# Patient Record
Sex: FEMALE | URBAN - NONMETROPOLITAN AREA
[De-identification: names, ages, dates, MRNs, and addresses within clinical notes are randomized per-mention and may not be internally consistent; named-entity substitution may affect disease eponyms.]

---

## 2018-07-31 ENCOUNTER — HISTORICAL (OUTPATIENT)
Dept: ADMINISTRATIVE | Facility: HOSPITAL | Age: 53
End: 2018-07-31

## 2018-08-01 ENCOUNTER — HISTORICAL (OUTPATIENT)
Dept: ADMINISTRATIVE | Facility: HOSPITAL | Age: 53
End: 2018-08-01

## 2018-12-14 ENCOUNTER — HISTORICAL (OUTPATIENT)
Dept: ADMINISTRATIVE | Facility: HOSPITAL | Age: 53
End: 2018-12-14

## 2019-09-06 ENCOUNTER — HISTORICAL (OUTPATIENT)
Dept: ADMINISTRATIVE | Facility: HOSPITAL | Age: 54
End: 2019-09-06

## 2020-03-04 ENCOUNTER — HISTORICAL (OUTPATIENT)
Dept: ADMINISTRATIVE | Facility: HOSPITAL | Age: 55
End: 2020-03-04

## 2025-03-02 ENCOUNTER — HOSPITAL ENCOUNTER (EMERGENCY)
Facility: HOSPITAL | Age: 60
Discharge: HOME | End: 2025-03-02
Attending: FAMILY MEDICINE
Payer: COMMERCIAL

## 2025-03-02 VITALS
SYSTOLIC BLOOD PRESSURE: 147 MMHG | TEMPERATURE: 98 F | DIASTOLIC BLOOD PRESSURE: 96 MMHG | BODY MASS INDEX: 39.24 KG/M2 | OXYGEN SATURATION: 97 % | HEIGHT: 67 IN | HEART RATE: 74 BPM | WEIGHT: 250 LBS | RESPIRATION RATE: 18 BRPM

## 2025-03-02 DIAGNOSIS — R22.0 FACIAL MASS: Primary | ICD-10-CM

## 2025-03-02 PROCEDURE — 99285 EMERGENCY DEPT VISIT HI MDM: CPT | Mod: 25

## 2025-03-02 PROCEDURE — 63600175 PHARM REV CODE 636 W HCPCS: Performed by: FAMILY MEDICINE

## 2025-03-02 PROCEDURE — 96374 THER/PROPH/DIAG INJ IV PUSH: CPT

## 2025-03-02 RX ORDER — HYDROCODONE BITARTRATE AND ACETAMINOPHEN 10; 325 MG/1; MG/1
1 TABLET ORAL EVERY 12 HOURS PRN
Qty: 6 TABLET | Refills: 0 | Status: SHIPPED | OUTPATIENT
Start: 2025-03-02 | End: 2025-03-05

## 2025-03-02 RX ORDER — HYDRALAZINE HYDROCHLORIDE 20 MG/ML
20 INJECTION INTRAMUSCULAR; INTRAVENOUS
Status: COMPLETED | OUTPATIENT
Start: 2025-03-02 | End: 2025-03-02

## 2025-03-02 RX ADMIN — HYDRALAZINE HYDROCHLORIDE 10 MG: 20 INJECTION INTRAMUSCULAR; INTRAVENOUS at 10:03

## 2025-03-02 NOTE — ED PROVIDER NOTES
Encounter Date: 3/2/2025       History       Chief Complaint  Patient presents with  · Facial Swelling    Pt noticed a small bump to forehead above right eye about 3 months ago was given Methylprednisolone by PCP ([facility]). 3 days ago swelling started to increase and become more painful. Now having difficulty opening right eye and has blurred vision to right eye.           Review of patient's allergies indicates:  No Known Allergies  Past Medical History:   Diagnosis Date    Hypertension      Past Surgical History:   Procedure Laterality Date    CARDIAC SURGERY      Bypass     Family History   Problem Relation Name Age of Onset    No Known Problems Mother      No Known Problems Father       Social History[1]  Review of Systems   Constitutional:  Negative for activity change, appetite change, chills, diaphoresis and fever.   HENT:  Negative for congestion, dental problem, drooling, ear discharge, ear pain, hearing loss, nosebleeds, rhinorrhea, sore throat and tinnitus.    Eyes:  Negative for pain, discharge, redness and itching.   Respiratory:  Negative for apnea, choking, chest tightness and shortness of breath.    Cardiovascular:  Negative for chest pain.   Gastrointestinal:  Negative for abdominal distention, abdominal pain, anal bleeding, blood in stool and nausea.   Genitourinary:  Negative for decreased urine volume, dysuria, flank pain, hematuria and pelvic pain.   Musculoskeletal:  Negative for back pain.   Skin:  Negative for rash.        Soft circular swelling over the forehead    Allergic/Immunologic: Negative for environmental allergies.   Neurological:  Negative for dizziness, facial asymmetry, speech difficulty, weakness, numbness and headaches.   Hematological:  Does not bruise/bleed easily.   Psychiatric/Behavioral:  Negative for agitation, behavioral problems, confusion, decreased concentration, dysphoric mood and hallucinations.        Physical Exam     Initial Vitals [03/02/25 0910]   BP Pulse  Resp Temp SpO2   (!) 166/108 79 16 98.1 °F (36.7 °C) 96 %      MAP       --         Physical Exam    Nursing note and vitals reviewed.  Constitutional: She appears well-developed.   HENT:   Head: Normocephalic and atraumatic.   Eyes: Pupils are equal, round, and reactive to light.   Neck: No thyromegaly present. No tracheal deviation present.   Normal range of motion.  Cardiovascular:  Normal rate, regular rhythm, normal heart sounds and intact distal pulses.           Pulmonary/Chest: Breath sounds normal. No stridor. She has no wheezes. She has no rhonchi. She has no rales.   Abdominal: She exhibits no distension. There is no abdominal tenderness. There is no rebound and no guarding.   Musculoskeletal:         General: Normal range of motion.      Cervical back: Normal range of motion.     Neurological: She is alert and oriented to person, place, and time.   Skin: Skin is warm.   Soft about 2 inch by 2 inch swelling noted on the forehead    Psychiatric: She has a normal mood and affect.         ED Course   Procedures  Labs Reviewed - No data to display       Imaging Results              CT Head Without Contrast (Final result)  Result time 03/02/25 09:50:04      Final result by Stefan Riojas MD (03/02/25 09:50:04)                   Impression:      Destructive mass arising from frontal bone near the midline right paramidline region with extensive osseous erosion, destruction of the frontal bone, frontal sinuses, cribriform plate, ethmoid sinuses, and medial orbits on the right greater than left with partial extension to the right orbit, displacement distortion of the right orbital globe, and partial extension into the anterior cranial fossa with distortion of the anterior frontal lobes on the right greater than left.  Possibly include metastatic disease, or primary malignancy arising from the paranasal sinuses such as sinonasal adenocarcinoma.      Electronically signed by: Shayy Riojas  MD  Date:    03/02/2025  Time:    09:50               Narrative:    EXAMINATION:  CT HEAD WITHOUT CONTRAST    CLINICAL HISTORY:  Dizziness, persistent/recurrent, cardiac or vascular cause suspected;soft tissue swelling over the forehead no trauma;    TECHNIQUE:  Low dose axial images were obtained through the head.  Coronal and sagittal reformations were also performed. Contrast was not administered.  DLP 1061.  Automated exposure control used.    COMPARISON:  None.    FINDINGS:  Solid mass arising from the frontal bone measuring approximately 4.9 x 4.9 cm across, up to 7.5 cm craniocaudal dimension.  There is extensive bone destruction/erosion involving the midline right paramidline frontal bone, curve form plate, ethmoid sinuses, medial and superomedial orbital walls on the right greater than left.  There is partial extension into the right orbit with resulting orbital narrowing, displacement distortion proptosis of the orbital globe.  There is extension into the anterior cranial fossa with some distortion mass effect on the anterior aspect of the right frontal lobe.    Brain volume otherwise normal.  No intra or extra-axial hemorrhage evident.  Normal gray-white differentiation otherwise.                                       Medications   hydrALAZINE injection 20 mg (10 mg Intravenous Given 3/2/25 1026)     Medical Decision Making  There is a destructive lesion in the midline in the frontal aspect of her face that has shown signs of malignancy and osseous metastasis the patient was advised to follow with ENT for the tissue diagnosis and the follow up treatment asap that would be tomorrow the patient said that she is going to talk with the primary care physician Dr. Londono and she will try to arrange appointment with ENT risk and importance explained to the patient in great detail    Amount and/or Complexity of Data Reviewed  Radiology: ordered.    Risk  Prescription drug management.                                       Clinical Impression:  Final diagnoses:  [R22.0] Facial mass (Primary)          ED Disposition Condition    Discharge Stable          ED Prescriptions       Medication Sig Dispense Start Date End Date Auth. Provider    HYDROcodone-acetaminophen (NORCO)  mg per tablet Take 1 tablet by mouth every 12 (twelve) hours as needed. 6 tablet 3/2/2025 3/5/2025 Willy Pyle MD          Follow-up Information       Follow up With Specialties Details Why Contact Info    ENT  In 1 day  ASAP                 [1]   Social History  Tobacco Use    Smoking status: Every Day     Current packs/day: 1.00     Average packs/day: 1 pack/day for 43.2 years (43.2 ttl pk-yrs)     Types: Cigarettes     Start date: 1982    Smokeless tobacco: Never   Substance Use Topics    Alcohol use: Not Currently    Drug use: Not Currently        Willy Pyle MD  03/02/25 154

## 2025-03-14 DIAGNOSIS — D14.0 BENIGN NEOPLASM OF CARTILAGE OF NOSE: Primary | ICD-10-CM

## 2025-03-17 ENCOUNTER — HOSPITAL ENCOUNTER (OUTPATIENT)
Dept: RADIOLOGY | Facility: HOSPITAL | Age: 60
Discharge: HOME | End: 2025-03-17
Attending: OTOLARYNGOLOGY
Payer: COMMERCIAL

## 2025-03-17 DIAGNOSIS — D14.0 BENIGN NEOPLASM OF CARTILAGE OF NOSE: ICD-10-CM

## 2025-03-17 PROCEDURE — 70553 MRI BRAIN STEM W/O & W/DYE: CPT | Mod: TC

## 2025-03-17 PROCEDURE — 25500020 PHARM REV CODE 255: Performed by: OTOLARYNGOLOGY

## 2025-03-17 PROCEDURE — A9577 INJ MULTIHANCE: HCPCS | Performed by: OTOLARYNGOLOGY

## 2025-03-17 RX ADMIN — GADOBENATE DIMEGLUMINE 20 ML: 529 INJECTION, SOLUTION INTRAVENOUS at 12:03

## 2025-04-26 ENCOUNTER — HOSPITAL ENCOUNTER (EMERGENCY)
Facility: HOSPITAL | Age: 60
Discharge: ANOTHER HOSPITAL | End: 2025-04-26
Payer: COMMERCIAL

## 2025-04-26 VITALS
BODY MASS INDEX: 41.48 KG/M2 | OXYGEN SATURATION: 96 % | RESPIRATION RATE: 18 BRPM | HEIGHT: 65 IN | WEIGHT: 249 LBS | HEART RATE: 76 BPM | TEMPERATURE: 99 F | SYSTOLIC BLOOD PRESSURE: 122 MMHG | DIASTOLIC BLOOD PRESSURE: 68 MMHG

## 2025-04-26 DIAGNOSIS — N17.9 AKI (ACUTE KIDNEY INJURY): ICD-10-CM

## 2025-04-26 DIAGNOSIS — L03.011 PARONYCHIA OF THUMB, RIGHT: ICD-10-CM

## 2025-04-26 DIAGNOSIS — R65.20 SEVERE SEPSIS WITH ACUTE ORGAN DYSFUNCTION: Primary | ICD-10-CM

## 2025-04-26 DIAGNOSIS — Z13.6 SCREENING FOR CARDIOVASCULAR CONDITION: ICD-10-CM

## 2025-04-26 DIAGNOSIS — E83.42 HYPOMAGNESEMIA: ICD-10-CM

## 2025-04-26 DIAGNOSIS — E87.1 HYPONATREMIA: ICD-10-CM

## 2025-04-26 DIAGNOSIS — E87.6 HYPOKALEMIA: ICD-10-CM

## 2025-04-26 DIAGNOSIS — R53.1 WEAKNESS: ICD-10-CM

## 2025-04-26 DIAGNOSIS — A41.9 SEVERE SEPSIS WITH ACUTE ORGAN DYSFUNCTION: Primary | ICD-10-CM

## 2025-04-26 DIAGNOSIS — D69.6 THROMBOCYTOPENIA: ICD-10-CM

## 2025-04-26 DIAGNOSIS — T80.219A INFECTED VENOUS ACCESS PORT, INITIAL ENCOUNTER: ICD-10-CM

## 2025-04-26 DIAGNOSIS — C85.11 B-CELL LYMPHOMA OF LYMPH NODES OF HEAD, UNSPECIFIED B-CELL LYMPHOMA TYPE: ICD-10-CM

## 2025-04-26 LAB
ABS NEUT CALC (OHS): 6.61 X10(3)/MCL (ref 2.1–9.2)
ALBUMIN SERPL-MCNC: 3.3 G/DL (ref 3.4–5)
ALBUMIN/GLOB SERPL: 1.2 RATIO
ALP SERPL-CCNC: 76 UNIT/L (ref 50–144)
ALT SERPL-CCNC: 36 UNIT/L (ref 1–45)
AMORPH URATE CRY URNS QL MICRO: ABNORMAL /HPF
ANION GAP SERPL CALC-SCNC: 10 MEQ/L (ref 2–13)
ANISOCYTOSIS BLD QL SMEAR: ABNORMAL
AST SERPL-CCNC: 45 UNIT/L (ref 14–36)
BACTERIA #/AREA URNS AUTO: ABNORMAL /HPF
BILIRUB SERPL-MCNC: 0.8 MG/DL (ref 0–1)
BILIRUB UR QL STRIP.AUTO: ABNORMAL
BUN SERPL-MCNC: 31 MG/DL (ref 7–20)
CALCIUM SERPL-MCNC: 7.4 MG/DL (ref 8.4–10.2)
CHLORIDE SERPL-SCNC: 92 MMOL/L (ref 98–110)
CLARITY UR: ABNORMAL
CO2 SERPL-SCNC: 21 MMOL/L (ref 21–32)
COLOR UR AUTO: YELLOW
CREAT SERPL-MCNC: 3.17 MG/DL (ref 0.66–1.25)
CREAT/UREA NIT SERPL: 10 (ref 12–20)
ERYTHROCYTE [DISTWIDTH] IN BLOOD BY AUTOMATED COUNT: 13.7 % (ref 11–14.5)
FLUAV AG UPPER RESP QL IA.RAPID: NOT DETECTED
FLUBV AG UPPER RESP QL IA.RAPID: NOT DETECTED
GFR SERPLBLD CREATININE-BSD FMLA CKD-EPI: 16 ML/MIN/1.73/M2
GIANT PLATELETS: ABNORMAL
GLOBULIN SER-MCNC: 2.8 GM/DL (ref 2–3.9)
GLUCOSE SERPL-MCNC: 263 MG/DL (ref 70–115)
GLUCOSE UR QL STRIP: 100
HCT VFR BLD AUTO: 32.9 % (ref 36–48)
HGB BLD-MCNC: 11.4 G/DL (ref 11.8–16)
HGB UR QL STRIP: ABNORMAL
KETONES UR QL STRIP: NEGATIVE
LACTATE SERPL-SCNC: 2.1 MMOL/L (ref 0.4–2)
LEUKOCYTE ESTERASE UR QL STRIP: NEGATIVE
LYMPHOCYTES NFR BLD MANUAL: 0.79 X10(3)/MCL (ref 0.6–4.6)
LYMPHOCYTES NFR BLD MANUAL: 10 % (ref 20–55)
MAGNESIUM SERPL-MCNC: 1.4 MG/DL (ref 1.8–2.4)
MCH RBC QN AUTO: 27.9 PG (ref 27–34)
MCHC RBC AUTO-ENTMCNC: 34.7 G/DL (ref 31–37)
MCV RBC AUTO: 80.4 FL (ref 79–99)
METAMYELOCYTES NFR BLD MANUAL: 1 %
MICROCYTES BLD QL SMEAR: ABNORMAL
MONOCYTES NFR BLD MANUAL: 0.39 X10(3)/MCL (ref 0.1–1.3)
MONOCYTES NFR BLD MANUAL: 5 % (ref 0–10)
NEUTROPHILS NFR BLD MANUAL: 70 % (ref 37–73)
NEUTS BAND NFR BLD MANUAL: 14 % (ref 0–5)
NITRITE UR QL STRIP: NEGATIVE
PH UR STRIP: 5.5 [PH]
PHOSPHATE SERPL-MCNC: 3 MG/DL (ref 2.5–4.9)
PLATELET # BLD AUTO: 90 X10(3)/MCL (ref 140–371)
PLATELET # BLD EST: ABNORMAL 10*3/UL
PMV BLD AUTO: 11 FL (ref 9.4–12.4)
POLYCHROMASIA BLD QL SMEAR: SLIGHT
POTASSIUM SERPL-SCNC: 2.8 MMOL/L (ref 3.5–5.1)
PROT SERPL-MCNC: 6.1 GM/DL (ref 6.3–8.2)
PROT UR QL STRIP: 30
RBC # BLD AUTO: 4.09 X10(6)/MCL (ref 4–5.1)
RBC #/AREA URNS AUTO: ABNORMAL /HPF
SARS-COV-2 RNA RESP QL NAA+PROBE: NEGATIVE
SODIUM SERPL-SCNC: 123 MMOL/L (ref 136–145)
SP GR UR STRIP.AUTO: >=1.03 (ref 1–1.03)
SQUAMOUS #/AREA URNS AUTO: ABNORMAL /HPF
TOXIC GRANULES BLD QL SMEAR: ABNORMAL
UROBILINOGEN UR STRIP-ACNC: 0.2
WBC # BLD AUTO: 7.87 X10(3)/MCL (ref 4–11.5)
WBC #/AREA URNS AUTO: ABNORMAL /HPF
WBC VACUOLES (OHS): ABNORMAL

## 2025-04-26 PROCEDURE — 96365 THER/PROPH/DIAG IV INF INIT: CPT

## 2025-04-26 PROCEDURE — 96368 THER/DIAG CONCURRENT INF: CPT

## 2025-04-26 PROCEDURE — 81015 MICROSCOPIC EXAM OF URINE: CPT

## 2025-04-26 PROCEDURE — 63600175 PHARM REV CODE 636 W HCPCS

## 2025-04-26 PROCEDURE — 81003 URINALYSIS AUTO W/O SCOPE: CPT

## 2025-04-26 PROCEDURE — 83735 ASSAY OF MAGNESIUM: CPT

## 2025-04-26 PROCEDURE — 93005 ELECTROCARDIOGRAM TRACING: CPT

## 2025-04-26 PROCEDURE — 0240U COVID/FLU A&B PCR: CPT

## 2025-04-26 PROCEDURE — 83605 ASSAY OF LACTIC ACID: CPT

## 2025-04-26 PROCEDURE — 96367 TX/PROPH/DG ADDL SEQ IV INF: CPT

## 2025-04-26 PROCEDURE — 84100 ASSAY OF PHOSPHORUS: CPT

## 2025-04-26 PROCEDURE — 99291 CRITICAL CARE FIRST HOUR: CPT

## 2025-04-26 PROCEDURE — 85025 COMPLETE CBC W/AUTO DIFF WBC: CPT

## 2025-04-26 PROCEDURE — 87086 URINE CULTURE/COLONY COUNT: CPT

## 2025-04-26 PROCEDURE — 80053 COMPREHEN METABOLIC PANEL: CPT

## 2025-04-26 PROCEDURE — 96361 HYDRATE IV INFUSION ADD-ON: CPT

## 2025-04-26 PROCEDURE — 25000003 PHARM REV CODE 250

## 2025-04-26 PROCEDURE — 87427 SHIGA-LIKE TOXIN AG IA: CPT | Performed by: FAMILY MEDICINE

## 2025-04-26 PROCEDURE — 87070 CULTURE OTHR SPECIMN AEROBIC: CPT

## 2025-04-26 PROCEDURE — 87184 SC STD DISK METHOD PER PLATE: CPT

## 2025-04-26 PROCEDURE — 96366 THER/PROPH/DIAG IV INF ADDON: CPT

## 2025-04-26 PROCEDURE — 63600175 PHARM REV CODE 636 W HCPCS: Mod: JZ,TB | Performed by: FAMILY MEDICINE

## 2025-04-26 PROCEDURE — 93010 ELECTROCARDIOGRAM REPORT: CPT | Mod: ,,, | Performed by: INTERNAL MEDICINE

## 2025-04-26 RX ORDER — SODIUM CHLORIDE 9 MG/ML
1000 INJECTION, SOLUTION INTRAVENOUS
Status: COMPLETED | OUTPATIENT
Start: 2025-04-26 | End: 2025-04-26

## 2025-04-26 RX ORDER — DEXBROMPHENIRAMINE MALEATE, DEXTROMETHORPHAN HBR, PHENYLEPHRINE HCL 2; 20; 10 G/1; G/1; G/1
1 TABLET ORAL NIGHTLY
COMMUNITY
Start: 2025-03-22

## 2025-04-26 RX ORDER — GLUCOSAMINE/CHONDR SU A SOD 167-133 MG
500 CAPSULE ORAL
COMMUNITY

## 2025-04-26 RX ORDER — ACETAMINOPHEN 500 MG
1000 TABLET ORAL
Status: COMPLETED | OUTPATIENT
Start: 2025-04-26 | End: 2025-04-26

## 2025-04-26 RX ORDER — MAGNESIUM SULFATE 1 G/100ML
1 INJECTION INTRAVENOUS
Status: COMPLETED | OUTPATIENT
Start: 2025-04-26 | End: 2025-04-26

## 2025-04-26 RX ORDER — MULTIVITAMIN
1 TABLET ORAL EVERY MORNING
COMMUNITY

## 2025-04-26 RX ORDER — LANOLIN ALCOHOL/MO/W.PET/CERES
400 CREAM (GRAM) TOPICAL ONCE
Status: COMPLETED | OUTPATIENT
Start: 2025-04-26 | End: 2025-04-26

## 2025-04-26 RX ORDER — HYDROCODONE BITARTRATE AND ACETAMINOPHEN 5; 325 MG/1; MG/1
1 TABLET ORAL EVERY 6 HOURS PRN
COMMUNITY
Start: 2025-04-11

## 2025-04-26 RX ORDER — ASPIRIN 81 MG/1
81 TABLET ORAL EVERY MORNING
COMMUNITY

## 2025-04-26 RX ORDER — ALLOPURINOL 300 MG/1
300 TABLET ORAL DAILY
COMMUNITY
Start: 2025-04-15

## 2025-04-26 RX ORDER — EZETIMIBE 10 MG/1
10 TABLET ORAL NIGHTLY
COMMUNITY
Start: 2025-02-25

## 2025-04-26 RX ORDER — ROSUVASTATIN CALCIUM 40 MG/1
40 TABLET, COATED ORAL NIGHTLY
COMMUNITY

## 2025-04-26 RX ORDER — POTASSIUM CHLORIDE 7.45 MG/ML
10 INJECTION INTRAVENOUS
Status: COMPLETED | OUTPATIENT
Start: 2025-04-26 | End: 2025-04-26

## 2025-04-26 RX ADMIN — VANCOMYCIN HYDROCHLORIDE 2000 MG: 2 INJECTION, POWDER, LYOPHILIZED, FOR SOLUTION INTRAVENOUS at 06:04

## 2025-04-26 RX ADMIN — SODIUM CHLORIDE 1710 ML: 9 INJECTION, SOLUTION INTRAVENOUS at 05:04

## 2025-04-26 RX ADMIN — ACETAMINOPHEN 1000 MG: 500 TABLET, FILM COATED ORAL at 05:04

## 2025-04-26 RX ADMIN — MAGNESIUM SULFATE HEPTAHYDRATE 1 G: 1 INJECTION, SOLUTION INTRAVENOUS at 05:04

## 2025-04-26 RX ADMIN — MAGNESIUM OXIDE TAB 400 MG (241.3 MG ELEMENTAL MG) 400 MG: 400 (241.3 MG) TAB at 05:04

## 2025-04-26 RX ADMIN — METHYLPREDNISOLONE SODIUM SUCCINATE 60 MG: 40 INJECTION, POWDER, FOR SOLUTION INTRAMUSCULAR; INTRAVENOUS at 06:04

## 2025-04-26 RX ADMIN — PIPERACILLIN AND TAZOBACTAM 4.5 G: 4; .5 INJECTION, POWDER, FOR SOLUTION INTRAVENOUS; PARENTERAL at 05:04

## 2025-04-26 RX ADMIN — POTASSIUM CHLORIDE 10 MEQ: 7.46 INJECTION, SOLUTION INTRAVENOUS at 05:04

## 2025-04-26 RX ADMIN — SODIUM CHLORIDE 1000 ML: 9 INJECTION, SOLUTION INTRAVENOUS at 07:04

## 2025-04-26 RX ADMIN — POTASSIUM BICARBONATE 50 MEQ: 977.5 TABLET, EFFERVESCENT ORAL at 05:04

## 2025-04-26 NOTE — PROGRESS NOTES
"Pharmacokinetic Initial Assessment: IV Vancomycin    Assessment/Plan:    Initiate intravenous vancomycin with loading dose of 2000 mg once with subsequent doses when random concentrations are less than 20 mcg/mL  Desired empiric serum trough concentration is 15 to 20 mcg/mL  Draw vancomycin random level on 4/26 at 18:00.  Pharmacy will continue to follow and monitor vancomycin.      Please contact pharmacy with any questions regarding this assessment.     Thank you for the consult,   Poli Tong       Patient brief summary:  Leonila Bach is a 59 y.o. female initiated on antimicrobial therapy with IV Vancomycin for treatment of suspected sepsis    Drug Allergies:   Review of patient's allergies indicates:  No Known Allergies    Actual Body Weight:   112.9kg    Renal Function:   Estimated Creatinine Clearance: 24.2 mL/min (A) (based on SCr of 3.17 mg/dL (H)).,     Dialysis Method (if applicable):  N/A    CBC (last 72 hours):  Recent Labs   Lab Result Units 04/26/25  0514   WBC x10(3)/mcL 7.87   Hgb g/dL 11.4*   Hct % 32.9*   Platelet x10(3)/mcL 90*       Metabolic Panel (last 72 hours):  Recent Labs   Lab Result Units 04/26/25  0514 04/26/25  0525   Sodium mmol/L 123*  --    Potassium mmol/L 2.8*  --    Chloride mmol/L 92*  --    CO2 mmol/L 21  --    Glucose mg/dL 263*  --    Glucose, UA   --  100*   Blood Urea Nitrogen mg/dL 31*  --    Creatinine mg/dL 3.17*  --    Albumin g/dL 3.3*  --    Bilirubin Total mg/dL 0.8  --    ALP unit/L 76  --    AST unit/L 45*  --    ALT unit/L 36  --    Magnesium Level mg/dL 1.40*  --    Phosphorus Level mg/dL 3.0  --        Drug levels (last 3 results):  No results for input(s): "VANCOMYCINRA", "VANCORANDOM", "VANCOMYCINPE", "VANCOPEAK", "VANCOMYCINTR", "VANCOTROUGH" in the last 72 hours.    Microbiologic Results:  Microbiology Results (last 7 days)       Procedure Component Value Units Date/Time    Blood Culture x two cultures. Draw prior to antibiotics [[id number]] Collected: " 04/26/25 0514    Order Status: Sent Specimen: Blood     Blood Culture x two cultures. Draw prior to antibiotics [[id number]] Collected: 04/26/25 0514    Order Status: Sent Specimen: Blood     Wound Culture [[id number]] Collected: 04/26/25 0510    Order Status: Sent Specimen: Drainage from Neck, Anterior

## 2025-04-26 NOTE — ED NOTES
Care handoff report given by LEXX Tay. Patient  at bedside. Patient Alert/Oriented x 4. Redness noted to right upper chest and neck near mediport incision line. Pt sleeps with C-pap at night and had been placed on oxygen 2 lpm when sleeping.

## 2025-04-26 NOTE — ED PROVIDER NOTES
Encounter Date: 4/26/2025       History     Chief Complaint   Patient presents with    Fatigue     RECEIVED FIRST CHEMO TX LAST WEEK. HAD FAILURED PORT PLACEMENT TO L CHEST WALL. C/O INCREASED WEAKNESS AND FATIGUE STARTING YESTERDAY.      59-year-old female arrives via EMS complaining of weakness.  The weakness began yesterday.  She had very little appetite yesterday.  Unbeknownst to her and her , she now has a fever.  She has purulent drainage from a wound to her right neck, where a MediPort was placed 2.5 weeks ago.  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  She received her 1st dose of chemo 10 days ago.  Blood work from a few days ago reportedly showed leukopenia.  Her oncologist is Dr. Sibley.  She has not been admitted to the hospital since her diagnosis.    The history is provided by the patient, the spouse and medical records.     Review of patient's allergies indicates:  No Known Allergies  Past Medical History:   Diagnosis Date    Cancer     Sinus Tumor    Hypertension      Past Surgical History:   Procedure Laterality Date    CARDIAC SURGERY      Bypass    PORTACATH PLACEMENT Right      Family History   Problem Relation Name Age of Onset    No Known Problems Mother      No Known Problems Father       Social History[1]  Review of Systems   Constitutional:  Positive for fever.   HENT:  Negative for sore throat.    Respiratory:  Negative for shortness of breath.    Cardiovascular:  Negative for chest pain.   Gastrointestinal:  Negative for nausea.   Genitourinary:  Negative for dysuria.   Musculoskeletal:  Negative for back pain.   Skin:  Positive for wound. Negative for rash.   Neurological:  Positive for weakness.   Hematological:  Does not bruise/bleed easily.   All other systems reviewed and are negative.      Physical Exam     Initial Vitals [04/26/25 0451]   BP Pulse Resp Temp SpO2   (!) 95/57 107 20 (!) 101.2 °F (38.4 °C) 95 %      MAP       --          Physical Exam    Nursing note and vitals reviewed.  Constitutional: Vital signs are normal. She appears well-developed and well-nourished. She is cooperative.   She is conversant, but appears a bit ill   HENT:   Head: Normocephalic and atraumatic. Mouth/Throat: Oropharynx is clear and moist.   Eyes: Conjunctivae, EOM and lids are normal. Pupils are equal, round, and reactive to light.   Neck: Trachea normal. Neck supple.   Normal range of motion.  Cardiovascular:  Regular rhythm, normal heart sounds and intact distal pulses.           Tachycardia   Pulmonary/Chest: Breath sounds normal.   Abdominal: Abdomen is soft. Bowel sounds are normal.   Musculoskeletal:         General: Normal range of motion.      Cervical back: Normal, normal range of motion and neck supple.      Lumbar back: Normal.     Neurological: She is alert and oriented to person, place, and time. She has normal strength. Coordination normal.   Skin: Skin is warm, dry and intact. Capillary refill takes less than 2 seconds.   There was purulent drainage from a wound on the right base of her neck.  This is where the MediPort was placed.  There is also what appears to be a paronychia on her right thumb.   Psychiatric: She has a normal mood and affect. Her speech is normal and behavior is normal. Judgment and thought content normal. Cognition and memory are normal.         ED Course   Critical Care    Date/Time: 4/26/2025 5:51 AM    Performed by: Ke Weathers MD  Authorized by: Ke Weathers MD  Direct patient critical care time: 15 minutes  Additional history critical care time: 15 minutes  Ordering / reviewing critical care time: 10 minutes  Documentation critical care time: 15 minutes  Consulting other physicians critical care time: 10 minutes  Consult with family critical care time: 15 minutes  Total critical care time (exclusive of procedural time) : 80 minutes  Critical care time was exclusive of separately billable procedures and  treating other patients and teaching time.  Critical care was necessary to treat or prevent imminent or life-threatening deterioration of the following conditions: renal failure, sepsis and dehydration.  Critical care was time spent personally by me on the following activities: discussions with consultants, evaluation of patient's response to treatment, examination of patient, obtaining history from patient or surrogate, ordering and performing treatments and interventions, ordering and review of laboratory studies, ordering and review of radiographic studies, pulse oximetry, re-evaluation of patient's condition and review of old charts.  Subsequent provider of critical care: I assumed direction of critical care for this patient from another provider of my specialty.        Labs Reviewed   COMPREHENSIVE METABOLIC PANEL - Abnormal       Result Value    Sodium 123 (*)     Potassium 2.8 (*)     Chloride 92 (*)     CO2 21      Glucose 263 (*)     Blood Urea Nitrogen 31 (*)     Creatinine 3.17 (*)     Calcium 7.4 (*)     Protein Total 6.1 (*)     Albumin 3.3 (*)     Globulin 2.8      Albumin/Globulin Ratio 1.2      Bilirubin Total 0.8      ALP 76      ALT 36      AST 45 (*)     eGFR 16      Anion Gap 10.0      BUN/Creatinine Ratio 10 (*)    LACTIC ACID, PLASMA - Abnormal    Lactic Acid Level 2.1 (*)    URINALYSIS, REFLEX TO URINE CULTURE - Abnormal    Color, UA Yellow      Appearance, UA Slightly Cloudy (*)     Specific Gravity, UA >=1.030      pH, UA 5.5      Protein, UA 30 (*)     Glucose,  (*)     Ketones, UA Negative      Blood, UA Trace-Intact (*)     Bilirubin, UA Small (*)     Urobilinogen, UA 0.2      Nitrites, UA Negative      Leukocyte Esterase, UA Negative      Narrative:      URINE STABILITY IS 2 HOURS AT ROOM TEMP OR    SIX HOURS REFRIGERATED. PERFORMING TESTING ON    SPECIMENS GREATER THAN THIS AGE MAY AFFECT THE    FOLLOWING TESTS:    PH          SPECIFIC GRAVITY           BLOOD    CLARITY      BILIRUBIN               UROBILINOGEN   MAGNESIUM - Abnormal    Magnesium Level 1.40 (*)    CBC WITH DIFFERENTIAL - Abnormal    WBC 7.87      RBC 4.09      Hgb 11.4 (*)     Hct 32.9 (*)     MCV 80.4      MCH 27.9      MCHC 34.7      RDW 13.7      Platelet 90 (*)     MPV 11.0     MANUAL DIFFERENTIAL - Abnormal    Neutrophils % 70      Bands % 14 (*)     Lymphs % 10 (*)     Monocytes % 5      Metamyelocytes % 1 (*)     Neutrophils Abs Calc 6.6108      Lymphs Abs 0.787      Monocytes Abs 0.3935      Platelets Decreased (*)     Anisocytosis 1+ (*)     Microcytosis 1+ (*)     Polychromasia Slight (*)     Toxic Granulation 2+ (*)     Giant Platelets 1+      Vacuolated Grans 3+ (*)    URINALYSIS, MICROSCOPIC - Abnormal    Bacteria, UA Many (*)     Amporphous Urate Crystals, UA Moderate (*)     RBC, UA 3-5      WBC, UA 0-2      Squamous Epithelial Cells, UA Few (*)    PHOSPHORUS - Normal    Phosphorus Level 3.0     COVID/FLU A&B PCR - Normal    Influenza A PCR Not Detected      Influenza B PCR Not Detected      SARS-CoV-2 PCR Negative      Narrative:     The Xpert Xpress SARS-CoV-2/FLU/RSV plus is a rapid, multiplexed real-time PCR test intended for the simultaneous qualitative detection and differentiation of SARS-CoV-2, Influenza A, Influenza B, and respiratory syncytial virus (RSV) viral RNA in either nasopharyngeal swab or nasal swab specimens.         BLOOD CULTURE [facility]   BLOOD CULTURE [facility]   WOUND CULTURE ([facility])   CULTURE, URINE   STOOL CULTURE [facility]   CBC W/ AUTO DIFFERENTIAL    Narrative:     The following orders were created for panel order CBC auto differential.  Procedure                               Abnormality         Status                     ---------                               -----------         ------                     CBC with Differential[[id number]]       Abnormal            Final result               Manual Differential[[id number]]         Abnormal            Final result                 Please view  results for these tests on the individual orders.        ECG Results              EKG 12-lead (Preliminary result)  Result time 04/26/25 05:13:26      Wet Read by Ke Weathers MD (04/26/25 05:13:26, [facility], Emergency Medicine)    Sinus tachycardia, heart rate 103, left ventricular hypertrophy with repolarization abnormality, normal intervals, normal P waves, normal QRS, nonspecific ST and T-wave changes, normal QT                                  Imaging Results              X-Ray Chest AP Portable (Final result)  Result time 04/26/25 07:20:53      Final result by Cholo Restrepo MD (04/26/25 07:20:53)                   Impression:      No active cardiopulmonary disease is seen.      Electronically signed by: Cholo Restrepo  Date:    04/26/2025  Time:    07:20               Narrative:    EXAMINATION:  XR CHEST AP PORTABLE    CLINICAL HISTORY:  Sepsis;    TECHNIQUE:  Single frontal view of the chest was performed.    COMPARISON:  July 31, 2018    FINDINGS:  The heart size is within normal limits.  Pulmonary vasculature appears unremarkable.  Infusion catheter is noted via right-sided approach.  The lung fields appear clear.  No pleural effusion or pneumothorax are noted.                        Wet Read by Ke Weathers MD (04/26/25 05:51:30, [facility], Emergency [facility])    No discrete infiltrates                                     Medications   sodium chloride 0.9% bolus 1,710 mL 1,710 mL (0 mLs Intravenous Stopped 4/26/25 0703)   acetaminophen tablet 1,000 mg (1,000 mg Oral Given 4/26/25 0549)   potassium bicarbonate disintegrating tablet 50 mEq (50 mEq Oral Given 4/26/25 0549)   magnesium oxide tablet 400 mg (400 mg Oral Given 4/26/25 0549)   potassium chloride 10 mEq in 100 mL IVPB (0 mEq Intravenous Stopped 4/26/25 0702)   magnesium sulfate in dextrose IVPB (premix) 1 g (0 g Intravenous Stopped 4/26/25 0749)   0.9% NaCl  infusion (1,000 mLs Intravenous New Bag 4/26/25 0709)   vancomycin 2,000 mg in 0.9% NaCl 500 mL IVPB (admixture device) (2,000 mg Intravenous New Bag 4/26/25 0659)   methylPREDNISolone sodium succinate injection 60 mg (60 mg Intravenous Given 4/26/25 0604)     Medical Decision Making  Fever in a leukopenic patient, drainage from right neck, right thumb paronychia  Differential diagnosis: Sepsis, severe sepsis, septic shock, infected MediPort, pneumonia, UTI  Broad-spectrum antibiotics (Zosyn plus vancomycin) IV fluids, Tylenol  Septic workup, culture of neck drainage  Patient will most likely need to be transferred higher level of care (oncology, infectious Disease, surgery to remove MediPort)    Amount and/or Complexity of Data Reviewed  Labs: ordered.  Radiology: ordered and independent interpretation performed.    Risk  OTC drugs.  Prescription drug management.                                      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

## 2025-04-26 NOTE — ED NOTES
Right upper chest wound s/p mediport placement red, warm, and draining serosanguinous fluid. Sample collected and sent to lab.

## 2025-04-26 NOTE — ED NOTES
[facility] here. Report given to Anay Paramedic.   Nurse to nurse report given to LEXX Hansen at [facility]

## 2025-04-27 NOTE — ED NOTES
Received preliminary results of blood cultures, notified [facility] and spoke to aYz, stated to fax lab results to their ER at [phone]. Spoke with Brinda in lab once again, notified to fax results to [facility] at [phone], Brinda understood.

## 2025-04-28 LAB
BACTERIA UR CULT: ABNORMAL
BACTERIA WND CULT: ABNORMAL
OHS QRS DURATION: 82 MS
OHS QTC CALCULATION: 468 MS

## 2025-04-29 LAB — BACTERIA STL CULT: NORMAL

## 2025-04-30 LAB
BACTERIA BLD CULT: ABNORMAL
GRAM STN SPEC: ABNORMAL

## 2025-05-13 ENCOUNTER — LAB VISIT (OUTPATIENT)
Dept: LAB | Facility: HOSPITAL | Age: 60
End: 2025-05-13
Attending: STUDENT IN AN ORGANIZED HEALTH CARE EDUCATION/TRAINING PROGRAM
Payer: COMMERCIAL

## 2025-05-13 DIAGNOSIS — N17.9 ACUTE KIDNEY FAILURE, UNSPECIFIED: ICD-10-CM

## 2025-05-13 DIAGNOSIS — E11.9 DIABETES MELLITUS WITHOUT COMPLICATION: Primary | ICD-10-CM

## 2025-05-13 LAB
ALBUMIN SERPL-MCNC: 3.2 G/DL (ref 3.4–5)
BASOPHILS # BLD AUTO: 0.15 X10(3)/MCL (ref 0.01–0.08)
BASOPHILS NFR BLD AUTO: 1.2 % (ref 0.1–1.2)
BILIRUB UR QL STRIP.AUTO: NEGATIVE
BUN SERPL-MCNC: 18 MG/DL (ref 7–20)
CALCIUM SERPL-MCNC: 8.9 MG/DL (ref 8.4–10.2)
CALCIUM SERPL-MCNC: 9.1 MG/DL (ref 8.4–10.2)
CHLORIDE SERPL-SCNC: 109 MMOL/L (ref 98–110)
CLARITY UR: CLEAR
CO2 SERPL-SCNC: 26 MMOL/L (ref 21–32)
COLOR UR AUTO: YELLOW
CREAT SERPL-MCNC: 1.13 MG/DL (ref 0.66–1.25)
CREAT UR-MCNC: 46 MG/DL (ref 45–106)
CREAT UR-MCNC: 49.2 MG/DL
CREAT/UREA NIT SERPL: 16 (ref 12–20)
EOSINOPHIL # BLD AUTO: 0.01 X10(3)/MCL (ref 0.04–0.36)
EOSINOPHIL NFR BLD AUTO: 0.1 % (ref 0.7–7)
ERYTHROCYTE [DISTWIDTH] IN BLOOD BY AUTOMATED COUNT: 14.2 % (ref 11–14.5)
GFR SERPLBLD CREATININE-BSD FMLA CKD-EPI: 57 ML/MIN/1.73/M2
GLUCOSE SERPL-MCNC: 157 MG/DL (ref 70–115)
GLUCOSE UR QL STRIP: NEGATIVE
HCT VFR BLD AUTO: 29.3 % (ref 36–48)
HGB BLD-MCNC: 9.7 G/DL (ref 11.8–16)
HGB UR QL STRIP: NEGATIVE
IMM GRANULOCYTES # BLD AUTO: 0.08 X10(3)/MCL (ref 0–0.03)
IMM GRANULOCYTES NFR BLD AUTO: 0.6 % (ref 0–0.5)
KETONES UR QL STRIP: NEGATIVE
LEUKOCYTE ESTERASE UR QL STRIP: NEGATIVE
LYMPHOCYTES # BLD AUTO: 2.05 X10(3)/MCL (ref 1.16–3.74)
LYMPHOCYTES NFR BLD AUTO: 16 % (ref 20–55)
MCH RBC QN AUTO: 28.2 PG (ref 27–34)
MCHC RBC AUTO-ENTMCNC: 33.1 G/DL (ref 31–37)
MCV RBC AUTO: 85.2 FL (ref 79–99)
MICROALBUMIN UR-MCNC: 19.4 UG/ML
MICROALBUMIN/CREAT RATIO PNL UR: 42.2 MG/GM CR (ref 0–30)
MONOCYTES # BLD AUTO: 1.05 X10(3)/MCL (ref 0.24–0.36)
MONOCYTES NFR BLD AUTO: 8.2 % (ref 4.7–12.5)
NEUTROPHILS # BLD AUTO: 9.49 X10(3)/MCL (ref 1.56–6.13)
NEUTROPHILS NFR BLD AUTO: 73.9 % (ref 37–73)
NITRITE UR QL STRIP: NEGATIVE
NRBC BLD AUTO-RTO: 0 %
PH UR STRIP: 6 [PH]
PHOSPHATE SERPL-MCNC: 3.8 MG/DL (ref 2.5–4.9)
PLATELET # BLD AUTO: 578 X10(3)/MCL (ref 140–371)
PMV BLD AUTO: 9.6 FL (ref 9.4–12.4)
POTASSIUM SERPL-SCNC: 4.2 MMOL/L (ref 3.5–5.1)
PROT UR QL STRIP: NEGATIVE
PROT UR STRIP-MCNC: 20 MG/DL
PTH-INTACT SERPL-MCNC: 77.6 PG/ML (ref 14–73)
RBC # BLD AUTO: 3.44 X10(6)/MCL (ref 4–5.1)
SODIUM SERPL-SCNC: 139 MMOL/L (ref 136–145)
SP GR UR STRIP.AUTO: <=1.005 (ref 1–1.03)
URINE PROTEIN/CREATININE RATIO (OLG): 0.4
UROBILINOGEN UR STRIP-ACNC: 0.2
WBC # BLD AUTO: 12.83 X10(3)/MCL (ref 4–11.5)

## 2025-05-13 PROCEDURE — 81003 URINALYSIS AUTO W/O SCOPE: CPT

## 2025-05-13 PROCEDURE — 83970 ASSAY OF PARATHORMONE: CPT

## 2025-05-13 PROCEDURE — 82570 ASSAY OF URINE CREATININE: CPT

## 2025-05-13 PROCEDURE — 36415 COLL VENOUS BLD VENIPUNCTURE: CPT

## 2025-05-13 PROCEDURE — 82570 ASSAY OF URINE CREATININE: CPT | Mod: 59

## 2025-05-13 PROCEDURE — 80069 RENAL FUNCTION PANEL: CPT

## 2025-05-13 PROCEDURE — 85025 COMPLETE CBC W/AUTO DIFF WBC: CPT

## 2025-06-20 ENCOUNTER — HOSPITAL ENCOUNTER (EMERGENCY)
Facility: HOSPITAL | Age: 60
Discharge: ANOTHER HOSPITAL | End: 2025-06-20
Admitting: INTERNAL MEDICINE
Payer: COMMERCIAL

## 2025-06-20 VITALS
BODY MASS INDEX: 39.92 KG/M2 | HEART RATE: 83 BPM | TEMPERATURE: 100 F | HEIGHT: 65 IN | DIASTOLIC BLOOD PRESSURE: 67 MMHG | OXYGEN SATURATION: 99 % | RESPIRATION RATE: 18 BRPM | WEIGHT: 239.63 LBS | SYSTOLIC BLOOD PRESSURE: 102 MMHG

## 2025-06-20 DIAGNOSIS — C85.18 B-CELL LYMPHOMA OF LYMPH NODES OF MULTIPLE REGIONS, UNSPECIFIED B-CELL LYMPHOMA TYPE: ICD-10-CM

## 2025-06-20 DIAGNOSIS — R79.89 ELEVATED TROPONIN LEVEL: ICD-10-CM

## 2025-06-20 DIAGNOSIS — R79.89 ELEVATED BRAIN NATRIURETIC PEPTIDE (BNP) LEVEL: ICD-10-CM

## 2025-06-20 DIAGNOSIS — Z13.6 SCREENING FOR CARDIOVASCULAR CONDITION: ICD-10-CM

## 2025-06-20 DIAGNOSIS — R65.20 SEVERE SEPSIS: Primary | ICD-10-CM

## 2025-06-20 DIAGNOSIS — N17.9 ACUTE KIDNEY INJURY: ICD-10-CM

## 2025-06-20 DIAGNOSIS — E87.1 HYPONATREMIA: ICD-10-CM

## 2025-06-20 DIAGNOSIS — D61.818 PANCYTOPENIA: ICD-10-CM

## 2025-06-20 DIAGNOSIS — E83.42 HYPOMAGNESEMIA: ICD-10-CM

## 2025-06-20 DIAGNOSIS — A41.9 SEVERE SEPSIS: Primary | ICD-10-CM

## 2025-06-20 LAB
ABORH RETYPE: NORMAL
ABS NEUT CALC (OHS): 1.74 X10(3)/MCL (ref 2.1–9.2)
ADV 40+41 DNA STL QL NAA+NON-PROBE: NOT DETECTED
ALBUMIN SERPL-MCNC: 2.8 G/DL (ref 3.4–5)
ALBUMIN/GLOB SERPL: 1.2 RATIO
ALP SERPL-CCNC: 54 UNIT/L (ref 50–144)
ALT SERPL-CCNC: 25 UNIT/L (ref 1–45)
ANION GAP SERPL CALC-SCNC: 6 MEQ/L (ref 2–13)
ANISOCYTOSIS BLD QL SMEAR: SLIGHT
AST SERPL-CCNC: 26 UNIT/L (ref 14–36)
ASTRO TYP 1-8 RNA STL QL NAA+NON-PROBE: NOT DETECTED
BACTERIA #/AREA URNS AUTO: ABNORMAL /HPF
BILIRUB SERPL-MCNC: 0.9 MG/DL (ref 0–1)
BILIRUB UR QL STRIP.AUTO: NEGATIVE
BNP BLD-MCNC: 8100 PG/ML (ref 0–124.9)
BUN SERPL-MCNC: 23 MG/DL (ref 7–20)
C CAYETANENSIS DNA STL QL NAA+NON-PROBE: NOT DETECTED
C COLI+JEJ+UPSA DNA STL QL NAA+NON-PROBE: NOT DETECTED
C DIFF TOX A+B STL QL IA: NEGATIVE
CALCIUM SERPL-MCNC: 7.1 MG/DL (ref 8.4–10.2)
CHLORIDE SERPL-SCNC: 101 MMOL/L (ref 98–110)
CLARITY UR: CLEAR
CLOSTRIDIOIDES DIFFICILE GDH ANTIGEN (OHS): NEGATIVE
CO2 SERPL-SCNC: 19 MMOL/L (ref 21–32)
COLOR UR AUTO: YELLOW
CONSISTENCY STL: NORMAL
CREAT SERPL-MCNC: 1.57 MG/DL (ref 0.66–1.25)
CREAT/UREA NIT SERPL: 15 (ref 12–20)
CRYPTOSP DNA STL QL NAA+NON-PROBE: NOT DETECTED
E COLI O157 DNA STL QL NAA+NON-PROBE: NORMAL
E HISTOLYT DNA STL QL NAA+NON-PROBE: NOT DETECTED
EAEC PAA PLAS AGGR+AATA ST NAA+NON-PRB: NOT DETECTED
EC STX1+STX2 GENES STL QL NAA+NON-PROBE: NOT DETECTED
EPEC EAE GENE STL QL NAA+NON-PROBE: NOT DETECTED
ERYTHROCYTE [DISTWIDTH] IN BLOOD BY AUTOMATED COUNT: 16.2 % (ref 11–14.5)
ETEC LTA+ST1A+ST1B TOX ST NAA+NON-PROBE: NOT DETECTED
FLUAV AG UPPER RESP QL IA.RAPID: NOT DETECTED
FLUBV AG UPPER RESP QL IA.RAPID: NOT DETECTED
G LAMBLIA DNA STL QL NAA+NON-PROBE: NOT DETECTED
GFR SERPLBLD CREATININE-BSD FMLA CKD-EPI: 38 ML/MIN/1.73/M2
GLOBULIN SER-MCNC: 2.4 GM/DL (ref 2–3.9)
GLUCOSE SERPL-MCNC: 178 MG/DL (ref 70–115)
GLUCOSE UR QL STRIP: NEGATIVE
GROUP & RH: NORMAL
HCT VFR BLD AUTO: 22.4 % (ref 36–48)
HGB BLD-MCNC: 7.6 G/DL (ref 11.8–16)
HGB UR QL STRIP: ABNORMAL
INDIRECT COOMBS: NORMAL
KETONES UR QL STRIP: NEGATIVE
LACTATE SERPL-SCNC: 2.1 MMOL/L (ref 0.4–2)
LACTATE SERPL-SCNC: 3 MMOL/L (ref 0.4–2)
LEUKOCYTE ESTERASE UR QL STRIP: NEGATIVE
LYMPH ABN # BLD MANUAL: 10 %
LYMPHOCYTES NFR BLD MANUAL: 0.18 X10(3)/MCL (ref 0.6–4.6)
LYMPHOCYTES NFR BLD MANUAL: 7 % (ref 20–55)
MAGNESIUM SERPL-MCNC: 0.8 MG/DL (ref 1.8–2.4)
MCH RBC QN AUTO: 28.1 PG (ref 27–34)
MCHC RBC AUTO-ENTMCNC: 33.9 G/DL (ref 31–37)
MCV RBC AUTO: 83 FL (ref 79–99)
METAMYELOCYTES NFR BLD MANUAL: 1 %
MICROCYTES BLD QL SMEAR: SLIGHT
MONOCYTES NFR BLD MANUAL: 0.33 X10(3)/MCL (ref 0.1–1.3)
MONOCYTES NFR BLD MANUAL: 13 % (ref 0–10)
MUCOUS THREADS URNS QL MICRO: ABNORMAL /LPF
MYELOCYTES NFR BLD MANUAL: 1 %
NEUTROPHILS NFR BLD MANUAL: 56 % (ref 37–73)
NEUTS BAND NFR BLD MANUAL: 12 % (ref 0–5)
NITRITE UR QL STRIP: NEGATIVE
NOROVIRUS GI+II RNA STL QL NAA+NON-PROBE: NOT DETECTED
NRBC BLD AUTO-RTO: 0 %
OHS QRS DURATION: 76 MS
OHS QTC CALCULATION: 454 MS
P SHIGELLOIDES DNA STL QL NAA+NON-PROBE: NOT DETECTED
PH UR STRIP: 6 [PH]
PLATELET # BLD AUTO: 72 X10(3)/MCL (ref 140–371)
PLATELET # BLD EST: ABNORMAL 10*3/UL
PMV BLD AUTO: 10.4 FL (ref 9.4–12.4)
POTASSIUM SERPL-SCNC: 3.4 MMOL/L (ref 3.5–5.1)
PROT SERPL-MCNC: 5.2 GM/DL (ref 6.3–8.2)
PROT UR QL STRIP: 30
RBC # BLD AUTO: 2.7 X10(6)/MCL (ref 4–5.1)
RBC #/AREA URNS AUTO: ABNORMAL /HPF
RVA RNA STL QL NAA+NON-PROBE: NOT DETECTED
S ENT+BONG DNA STL QL NAA+NON-PROBE: NOT DETECTED
SAPO I+II+IV+V RNA STL QL NAA+NON-PROBE: NOT DETECTED
SARS-COV-2 RNA RESP QL NAA+PROBE: NEGATIVE
SHIGELLA SP+EIEC IPAH ST NAA+NON-PROBE: NOT DETECTED
SODIUM SERPL-SCNC: 126 MMOL/L (ref 136–145)
SP GR UR STRIP.AUTO: 1.01 (ref 1–1.03)
SPECIMEN OUTDATE: NORMAL
SQUAMOUS #/AREA URNS AUTO: ABNORMAL /HPF
STREP A PCR (OHS): NOT DETECTED
TROPONIN I SERPL-MCNC: 0.06 NG/ML (ref 0–0.03)
UROBILINOGEN UR STRIP-ACNC: 0.2
V CHOL+PARA+VUL DNA STL QL NAA+NON-PROBE: NOT DETECTED
V CHOLERAE DNA STL QL NAA+NON-PROBE: NOT DETECTED
WBC # BLD AUTO: 2.56 X10(3)/MCL (ref 4–11.5)
WBC #/AREA URNS AUTO: ABNORMAL /HPF
Y ENTEROCOL DNA STL QL NAA+NON-PROBE: NOT DETECTED

## 2025-06-20 PROCEDURE — 93010 ELECTROCARDIOGRAM REPORT: CPT | Mod: ,,, | Performed by: INTERNAL MEDICINE

## 2025-06-20 PROCEDURE — 86318 IA INFECTIOUS AGENT ANTIBODY: CPT

## 2025-06-20 PROCEDURE — 0240U COVID/FLU A&B PCR: CPT

## 2025-06-20 PROCEDURE — 84484 ASSAY OF TROPONIN QUANT: CPT

## 2025-06-20 PROCEDURE — 96368 THER/DIAG CONCURRENT INF: CPT

## 2025-06-20 PROCEDURE — 25000003 PHARM REV CODE 250

## 2025-06-20 PROCEDURE — 87651 STREP A DNA AMP PROBE: CPT

## 2025-06-20 PROCEDURE — 83735 ASSAY OF MAGNESIUM: CPT

## 2025-06-20 PROCEDURE — 87507 IADNA-DNA/RNA PROBE TQ 12-25: CPT

## 2025-06-20 PROCEDURE — 87040 BLOOD CULTURE FOR BACTERIA: CPT

## 2025-06-20 PROCEDURE — 96375 TX/PRO/DX INJ NEW DRUG ADDON: CPT

## 2025-06-20 PROCEDURE — 25000003 PHARM REV CODE 250: Performed by: FAMILY MEDICINE

## 2025-06-20 PROCEDURE — 96367 TX/PROPH/DG ADDL SEQ IV INF: CPT

## 2025-06-20 PROCEDURE — 99291 CRITICAL CARE FIRST HOUR: CPT

## 2025-06-20 PROCEDURE — 81015 MICROSCOPIC EXAM OF URINE: CPT | Mod: XB

## 2025-06-20 PROCEDURE — 96366 THER/PROPH/DIAG IV INF ADDON: CPT

## 2025-06-20 PROCEDURE — 83880 ASSAY OF NATRIURETIC PEPTIDE: CPT

## 2025-06-20 PROCEDURE — 85025 COMPLETE CBC W/AUTO DIFF WBC: CPT

## 2025-06-20 PROCEDURE — 81003 URINALYSIS AUTO W/O SCOPE: CPT

## 2025-06-20 PROCEDURE — 80053 COMPREHEN METABOLIC PANEL: CPT

## 2025-06-20 PROCEDURE — 63600175 PHARM REV CODE 636 W HCPCS

## 2025-06-20 PROCEDURE — 83605 ASSAY OF LACTIC ACID: CPT

## 2025-06-20 PROCEDURE — 86900 BLOOD TYPING SEROLOGIC ABO: CPT

## 2025-06-20 PROCEDURE — 63600175 PHARM REV CODE 636 W HCPCS: Mod: JZ,TB | Performed by: FAMILY MEDICINE

## 2025-06-20 PROCEDURE — 96365 THER/PROPH/DIAG IV INF INIT: CPT | Mod: 59

## 2025-06-20 PROCEDURE — 93005 ELECTROCARDIOGRAM TRACING: CPT

## 2025-06-20 PROCEDURE — 96361 HYDRATE IV INFUSION ADD-ON: CPT

## 2025-06-20 RX ORDER — MAGNESIUM SULFATE 1 G/100ML
1 INJECTION INTRAVENOUS
Status: COMPLETED | OUTPATIENT
Start: 2025-06-20 | End: 2025-06-20

## 2025-06-20 RX ORDER — METRONIDAZOLE 500 MG/100ML
500 INJECTION, SOLUTION INTRAVENOUS
Status: DISCONTINUED | OUTPATIENT
Start: 2025-06-20 | End: 2025-06-20

## 2025-06-20 RX ORDER — ACETAMINOPHEN 500 MG
1000 TABLET ORAL
Status: COMPLETED | OUTPATIENT
Start: 2025-06-20 | End: 2025-06-20

## 2025-06-20 RX ORDER — INSULIN GLARGINE 100 [IU]/ML
10 INJECTION, SOLUTION SUBCUTANEOUS NIGHTLY
COMMUNITY

## 2025-06-20 RX ORDER — NOREPINEPHRINE BITARTRATE/D5W 4MG/250ML
0-.2 PLASTIC BAG, INJECTION (ML) INTRAVENOUS CONTINUOUS
Status: DISCONTINUED | OUTPATIENT
Start: 2025-06-20 | End: 2025-06-20 | Stop reason: HOSPADM

## 2025-06-20 RX ORDER — OLMESARTAN MEDOXOMIL AND HYDROCHLOROTHIAZIDE 40/12.5 40; 12.5 MG/1; MG/1
1 TABLET ORAL DAILY
COMMUNITY

## 2025-06-20 RX ORDER — KETOROLAC TROMETHAMINE 30 MG/ML
15 INJECTION, SOLUTION INTRAMUSCULAR; INTRAVENOUS
Status: COMPLETED | OUTPATIENT
Start: 2025-06-20 | End: 2025-06-20

## 2025-06-20 RX ORDER — PREDNISONE 20 MG/1
100 TABLET ORAL
COMMUNITY

## 2025-06-20 RX ORDER — GLUCOSAM/CHONDRO/HERB 149/HYAL 750-100 MG
TABLET ORAL DAILY
COMMUNITY
Start: 2025-04-09

## 2025-06-20 RX ADMIN — SODIUM CHLORIDE 1000 ML: 9 INJECTION, SOLUTION INTRAVENOUS at 03:06

## 2025-06-20 RX ADMIN — SODIUM CHLORIDE 2331 ML: 9 INJECTION, SOLUTION INTRAVENOUS at 04:06

## 2025-06-20 RX ADMIN — SODIUM CHLORIDE 500 ML: 9 INJECTION, SOLUTION INTRAVENOUS at 10:06

## 2025-06-20 RX ADMIN — NOREPINEPHRINE BITARTRATE 0.04 MCG/KG/MIN: 4 INJECTION, SOLUTION INTRAVENOUS at 11:06

## 2025-06-20 RX ADMIN — VANCOMYCIN HYDROCHLORIDE 2000 MG: 2 INJECTION, POWDER, LYOPHILIZED, FOR SOLUTION INTRAVENOUS at 05:06

## 2025-06-20 RX ADMIN — PIPERACILLIN AND TAZOBACTAM 4.5 G: 4; .5 INJECTION, POWDER, FOR SOLUTION INTRAVENOUS; PARENTERAL at 04:06

## 2025-06-20 RX ADMIN — MAGNESIUM SULFATE HEPTAHYDRATE 1 G: 1 INJECTION, SOLUTION INTRAVENOUS at 06:06

## 2025-06-20 RX ADMIN — KETOROLAC TROMETHAMINE 15 MG: 30 INJECTION, SOLUTION INTRAMUSCULAR at 09:06

## 2025-06-20 RX ADMIN — ACETAMINOPHEN 1000 MG: 500 TABLET ORAL at 07:06

## 2025-06-20 NOTE — ED PROVIDER NOTES
Encounter Date: 6/20/2025       History     Chief Complaint   Patient presents with    Diarrhea     DIARRHEA, FEVER. STARTED THIS AM     59-year-old female arrives via EMS complaining of fever and weakness.  She had a single loose bowel movement prior to arrival.  She is too weak to get up.  She is confused.  She denies any pain.  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  Her last chemo was 1 week ago, through the PICC line she has in her right arm.  She started feeling weak yesterday.  Her  took her temperature last night, and it was 102° F.  He gave her some Tylenol, and called EMS.  A month ago, she was septic from an infected MediPort, and had an extended hospital stay at [facility].    The history is provided by the spouse, the patient and the EMS personnel.     Review of patient's allergies indicates:  No Known Allergies  Past Medical History:   Diagnosis Date    Cancer     Sinus Tumor    Hypertension     Lymphoma      Past Surgical History:   Procedure Laterality Date    CARDIAC SURGERY      Bypass    PORTACATH PLACEMENT Right      Family History   Problem Relation Name Age of Onset    No Known Problems Mother      No Known Problems Father       Social History[1]  Review of Systems   Constitutional:  Positive for fever.   HENT:  Negative for sore throat.    Respiratory:  Negative for shortness of breath.    Cardiovascular:  Negative for chest pain.   Gastrointestinal:  Positive for diarrhea. Negative for nausea.   Genitourinary:  Negative for dysuria.   Musculoskeletal:  Negative for back pain.   Skin:  Negative for rash.   Neurological:  Positive for weakness.   Hematological:  Does not bruise/bleed easily.   All other systems reviewed and are negative.      Physical Exam     Initial Vitals [06/20/25 0311]   BP Pulse Resp Temp SpO2   (!) 64/46 110 16 99.4 °F (37.4 °C) 98 %      MAP       --         Physical Exam    Nursing note and vitals  reviewed.  Constitutional: Vital signs are normal. She appears well-developed. She is cooperative.   Obese female, appears quite ill.   HENT:   Head: Normocephalic and atraumatic.   Right Ear: External ear normal.   Left Ear: External ear normal. Mouth/Throat: Oropharynx is clear and moist.   Eyes: Conjunctivae, EOM and lids are normal. Pupils are equal, round, and reactive to light.   Neck: Trachea normal. Neck supple.   Normal range of motion.  Cardiovascular:  Regular rhythm, normal heart sounds and intact distal pulses.           Tachycardia   Pulmonary/Chest: Breath sounds normal.   Abdominal: Abdomen is soft. Bowel sounds are normal. She exhibits no distension. There is no abdominal tenderness. There is no rebound and no guarding.   Musculoskeletal:         General: Normal range of motion.      Cervical back: Normal, normal range of motion and neck supple.      Lumbar back: Normal.     Neurological: She is alert. She has normal strength. Coordination normal.   She answers questions.  But she repeats herself.  She is definitely confused.   Skin: Skin is warm, dry and intact. Capillary refill takes 2 to 3 seconds.   Psychiatric: Her speech is normal. Cognition and memory are normal.         ED Course   Critical Care    Date/Time: 6/20/2025 5:30 AM    Performed by: Ke Weathers MD  Authorized by: Ke Weathers MD  Direct patient critical care time: 20 minutes  Additional history critical care time: 20 minutes  Ordering / reviewing critical care time: 20 minutes  Documentation critical care time: 20 minutes  Consulting other physicians critical care time: 10 minutes  Consult with family critical care time: 15 minutes  Total critical care time (exclusive of procedural time) : 105 minutes  Critical care time was exclusive of separately billable procedures and treating other patients and teaching time.  Critical care was necessary to treat or prevent imminent or life-threatening deterioration of the  following conditions: cardiac failure, sepsis and renal failure.  Critical care was time spent personally by me on the following activities: discussions with consultants, evaluation of patient's response to treatment, examination of patient, obtaining history from patient or surrogate, ordering and performing treatments and interventions, ordering and review of laboratory studies, ordering and review of radiographic studies, pulse oximetry, re-evaluation of patient's condition and review of old charts.  Subsequent provider of critical care: I assumed direction of critical care for this patient from another provider of my specialty.        Labs Reviewed   COMPREHENSIVE METABOLIC PANEL - Abnormal       Result Value    Sodium 126 (*)     Potassium 3.4 (*)     Chloride 101      CO2 19 (*)     Glucose 178 (*)     Blood Urea Nitrogen 23 (*)     Creatinine 1.57 (*)     Calcium 7.1 (*)     Protein Total 5.2 (*)     Albumin 2.8 (*)     Globulin 2.4      Albumin/Globulin Ratio 1.2      Bilirubin Total 0.9      ALP 54      ALT 25      AST 26      eGFR 38      Anion Gap 6.0      BUN/Creatinine Ratio 15     LACTIC ACID, PLASMA - Abnormal    Lactic Acid Level 3.0 (*)    URINALYSIS, REFLEX TO URINE CULTURE - Abnormal    Color, UA Yellow      Appearance, UA Clear      Specific Gravity, UA 1.010      pH, UA 6.0      Protein, UA 30 (*)     Glucose, UA Negative      Ketones, UA Negative      Blood, UA Small (*)     Bilirubin, UA Negative      Urobilinogen, UA 0.2      Nitrites, UA Negative      Leukocyte Esterase, UA Negative      Narrative:      URINE STABILITY IS 2 HOURS AT ROOM TEMP OR    SIX HOURS REFRIGERATED. PERFORMING TESTING ON    SPECIMENS GREATER THAN THIS AGE MAY AFFECT THE    FOLLOWING TESTS:    PH          SPECIFIC GRAVITY           BLOOD    CLARITY     BILIRUBIN               UROBILINOGEN   MAGNESIUM - Abnormal    Magnesium Level 0.80 (*)    CBC WITH DIFFERENTIAL - Abnormal    WBC 2.56 (*)     RBC 2.70 (*)     Hgb 7.6  (*)     Hct 22.4 (*)     MCV 83.0      MCH 28.1      MCHC 33.9      RDW 16.2 (*)     Platelet 72 (*)     MPV 10.4      NRBC% 0.0     NT-PRO NATRIURETIC PEPTIDE - Abnormal    ProBNP 8,100.0 (*)    TROPONIN I - Abnormal    Troponin-I 0.059 (*)    MANUAL DIFFERENTIAL - Abnormal    Neutrophils % 56      Bands % 12 (*)     Lymphs % 7 (*)     Monocytes % 13 (*)     Metamyelocytes % 1 (*)     Myelocytes % 1 (*)     Abnormal Lymphs % 10      Neutrophils Abs Calc 1.7408 (*)     Lymphs Abs 0.1792 (*)     Monocytes Abs 0.3328      Platelets Decreased (*)     Anisocytosis Slight (*)     Microcytosis Slight (*)    LACTIC ACID, PLASMA - Abnormal    Lactic Acid Level 2.1 (*)    URINALYSIS, MICROSCOPIC - Abnormal    Bacteria, UA Few (*)     Mucous, UA Trace (*)     RBC, UA 0-5      WBC, UA 0-2      Squamous Epithelial Cells, UA Occasional     CLOSTRIDIOIDES DIFFICILE TOXIN A AND B, EIA - Normal    Clostridioides difficile GDH Antigen Negative      Clostridioides difficile Toxin A/B Negative     COVID/FLU A&B PCR - Normal    Influenza A PCR Not Detected      Influenza B PCR Not Detected      SARS-CoV-2 PCR Negative      Narrative:     The Xpert Xpress SARS-CoV-2/FLU/RSV plus is a rapid, multiplexed real-time PCR test intended for the simultaneous qualitative detection and differentiation of SARS-CoV-2, Influenza A, Influenza B, and respiratory syncytial virus (RSV) viral RNA in either nasopharyngeal swab or nasal swab specimens.         STREP GROUP A BY PCR - Normal    STREP A PCR ([facility]) Not Detected      Narrative:     The Xpert Xpress Strep A test is a rapid, qualitative in vitro diagnostic test for the detection of Streptococcus pyogenes (Group A ß-hemolytic Streptococcus, Strep A) in throat swab specimens from patients with signs and symptoms of pharyngitis.     BLOOD CULTURE [facility]   BLOOD CULTURE [facility]   CBC W/ AUTO DIFFERENTIAL    Narrative:     The following orders were created for panel order CBC auto differential.  Procedure                                Abnormality         Status                     ---------                               -----------         ------                     CBC with Differential[[id number]]       Abnormal            Final result               Manual Differential[[id number]]         Abnormal            Final result                 Please view results for these tests on the individual orders.   GI PANEL    Stool Consistancy liquid      CAMPYLOBACTER Not Detected      PLESIOMONAS SHIGELLOIDES Not Detected      SALMONELLA Not Detected      Vibrio sp. Not Detected      VIBRIO CHOLERAE Not Detected      YERSINIA ENTEROCOLITICA Not Detected      ENTEROAGGREGATIVE E. COLI (EAEC) Not Detected      ENTEROPATHOGENIC E. COLI (EPEC) Not Detected      Enterotoxigenic E. coli (ETEC) Not Detected      SHIGA-LIKE TOXIN-PRODUCING E. COLI (STEC) Not Detected      E. COLI O157        Shigella/Enteroinvasive E. coli (EIEC) Not Detected      CRYPTOSPORIDIUM Not Detected      Cyclospora cayetanensis Not Detected      Entamoeba histolytica Not Detected      Giardia lamblia Not Detected      Adenovirus F 40/41 Not Detected      Astrovirus Not Detected      Norovirus GI/GII Not Detected      Rotavirus A Not Detected      Sapovirus Not Detected      Narrative:     The Passenger Baggage Xpresse GI Panel is a multiplexed nucleic acid test intended for use with the Definicare® FilmArray®, CV Properties® 2.0, or CV Properties® BuzzFeed Systems for the simultaneous qualitative detection and identification of nucleic acids from multiple bacteria, viruses, and parasites directly from stool samples in Brenda Sachin transport medium obtained from individuals with signs and/or symptoms of gastrointestinal infection.   TYPE & SCREEN    Group & Rh O POS      Indirect Kenny GEL NEG      Specimen Outdate 06/23/2025 23:59     ABORH RETYPE    ABORH Retype O POS          ECG Results              EKG 12-lead (Final result)        Collection Time Result Time QRS Duration [facility] QTC Calculation     06/20/25 03:19:31 06/20/25 12:45:34 76 454                     Final result by Interface, Lab In [facility] (06/20/25 12:45:40)                   Narrative:    Test Reason : Z13.6,    Vent. Rate : 110 BPM     Atrial Rate : 110 BPM     P-R Int : 138 ms          QRS Dur :  76 ms      QT Int : 336 ms       P-R-T Axes :  47 -16  89 degrees    QTcB Int : 454 ms    Sinus tachycardia  Septal infarct ,age undetermined  Abnormal ECG  No previous ECGs available  Confirmed by Carlito Hidalgo ([id number]) on 6/20/2025 12:45:29 PM    Referred By: AAAREFERRAL SELF           Confirmed By: Carlito Hidalgo                      Wet Read by Ke Weathers MD (06/20/25 03:30:00, [facility], Emergency Medicine)    Sinus tachycardia, heart rate 110, normal intervals, normal axis, normal P waves, normal QRS, nonspecific ST and T-wave changes, normal QT                                  Imaging Results              X-Ray Chest AP Portable (Final result)  Result time 06/20/25 04:14:04      Final result by Shanell Messina III, MD (06/20/25 04:14:04)                   Impression:      1. The heart is mildly to moderately enlarged with vascular congestion and increased interstitial lung markings.  2. The distal tip of the patient's left-sided PICC line is located within the region of the middle 3rd of the superior vena cava.  3. Chronic changes are present as described above.      Electronically signed by: Shanell Messina  Date:    06/20/2025  Time:    04:14               Narrative:    EXAMINATION:  STUDY: XR CHEST AP PORTABLE    CLINICAL HISTORY AND TECHNIQUE:  Suspected sepsis    COMPARISON:  04/26/2025    FINDINGS:  Postoperative changes indicated previous median sternotomy.The heart is mildly to moderately enlarged with vascular congestion and slightly increased interstitial lung markings.I see no lobar or segmental infiltrates.No significant pleural effusions are noted.There is moderate demineralization of the skeletal  "structures with mild degenerative changes noted involving the thoracic spine.  Atherosclerotic calcifications are noted within the aortic arch.  A left-sided PICC line is present with the distal tip located within the region of the middle 3rd of the superior vena cava.                        Wet Read by Ke Weathers MD (06/20/25 03:48:35, [facility], Emergency Medicine)    Cardiomegaly, prominent pulmonary vascular congestion                                     Medications   sodium chloride 0.9% bolus 1,000 mL 1,000 mL (0 mLs Intravenous Stopped 6/20/25 0402)   piperacillin-tazobactam (ZOSYN) 4.5 g in D5W 100 mL IVPB (MB+) (0 g Intravenous Stopped 6/20/25 0515)   sodium chloride 0.9% bolus 2,331 mL 2,331 mL (0 mLs Intravenous Stopped 6/20/25 0618)   vancomycin 2,000 mg in D5W 500 mL IVPB (admixture device) (0 mg Intravenous Stopped 6/20/25 0834)   magnesium sulfate in dextrose IVPB (premix) 1 g (0 g Intravenous Stopped 6/20/25 0813)   acetaminophen tablet 1,000 mg (1,000 mg Oral Given 6/20/25 0744)   ketorolac injection 15 mg (15 mg Intravenous Given 6/20/25 0946)   sodium chloride 0.9% bolus 500 mL 500 mL (0 mLs Intravenous Stopped 6/20/25 1047)     Medical Decision Making  Cancer patient, chemo 1 week ago, fever, confusion, hypotension  Differential diagnosis:  Septic shock, severe sepsis, UTI, pneumonia, viral infection, infectious diarrhea, strep throat, leukopenia  IV fluids, antibiotics  Septic workup, viral swabs, strep screen  Admission versus transfer    Amount and/or Complexity of Data Reviewed  Labs: ordered.  Radiology: ordered and independent interpretation performed.    Risk  OTC drugs.  Prescription drug management.               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, unspecified B-cell lymphoma type  [E87.1] Hyponatremia  [N17.9] Acute kidney injury  [E83.42] Hypomagnesemia  [A41.9, R65.20] Severe sepsis (Primary)  [R79.89] Elevated troponin level  [R79.89] Elevated brain natriuretic peptide (BNP) level          ED Disposition Condition    Transfer to Another Facility Stable                      [1]   Social History  Tobacco Use    Smoking status: Every Day     Current packs/day: 1.00     Average packs/day: 1 pack/day for 43.5 years (43.5 ttl pk-yrs)     Types: Cigarettes     Start date: 1982    Smokeless tobacco: Never   Substance Use Topics    Alcohol use: Not Currently    Drug use: Not Currently        Ke Weathers MD  06/20/25 1493

## 2025-06-20 NOTE — PROGRESS NOTES
"Pharmacokinetic Initial Assessment: IV Vancomycin    Assessment/Plan:    Initiate intravenous vancomycin with loading dose of 2000 mg once with subsequent doses when random concentrations are less than 20 mcg/mL  Desired empiric serum trough concentration is 15 to 20 mcg/mL  Draw vancomycin random level on 6/20 at 17:00.  Pharmacy will continue to follow and monitor vancomycin.      Please contact pharmacy with any questions regarding this assessment.     Thank you for the consult,   Poli Tong       Patient brief summary:  Leonila Bach is a 59 y.o. female initiated on antimicrobial therapy with IV Vancomycin for treatment of suspected sepsis    Drug Allergies:   Review of patient's allergies indicates:  No Known Allergies    Actual Body Weight:   108.7kg    Renal Function:   Estimated Creatinine Clearance: 47.9 mL/min (A) (based on SCr of 1.57 mg/dL (H)).,     Dialysis Method (if applicable):  N/A    CBC (last 72 hours):  Recent Labs   Lab Result Units 06/20/25  0446   WBC x10(3)/mcL 2.56*   Hgb g/dL 7.6*   Hct % 22.4*   Platelet x10(3)/mcL 72*       Metabolic Panel (last 72 hours):  Recent Labs   Lab Result Units 06/20/25  0446   Sodium mmol/L 126*   Potassium mmol/L 3.4*   Chloride mmol/L 101   CO2 mmol/L 19*   Glucose mg/dL 178*   Blood Urea Nitrogen mg/dL 23*   Creatinine mg/dL 1.57*   Albumin g/dL 2.8*   Bilirubin Total mg/dL 0.9   ALP unit/L 54   AST unit/L 26   ALT unit/L 25   Magnesium Level mg/dL 0.80*       Drug levels (last 3 results):  No results for input(s): "VANCOMYCINRA", "VANCORANDOM", "VANCOMYCINPE", "VANCOPEAK", "VANCOMYCINTR", "VANCOTROUGH" in the last 72 hours.    Microbiologic Results:  Microbiology Results (last 7 days)       Procedure Component Value Units Date/Time    Clostridium Diff Toxin, A & B, EIA [[id number]] Collected: 06/20/25 0449    Order Status: Sent Specimen: Stool Updated: 06/20/25 0537    C Diff Toxin by PCR [[id number]] Collected: 06/20/25 0449    Order Status: " Canceled Specimen: Stool Updated: 06/20/25 0453    Blood Culture x two cultures. Draw prior to antibiotics [[id number]] Collected: 06/20/25 0430    Order Status: Resulted Specimen: Blood Updated: 06/20/25 0452    Blood Culture x two cultures. Draw prior to antibiotics [[id number]] Collected: 06/20/25 0447    Order Status: Resulted Specimen: Blood Updated: 06/20/25 0452

## 2025-06-20 NOTE — ED NOTES
Spoke with Ness from transfer center and clarified with patient and family and they are ok with being transferred to [place] to see her cancer doctor.

## 2025-06-20 NOTE — ED PROVIDER NOTES
Encounter Date: 6/20/2025       History     Chief Complaint   Patient presents with    Diarrhea     DIARRHEA, FEVER. STARTED THIS AM     HPI  Review of patient's allergies indicates:  No Known Allergies  Past Medical History:   Diagnosis Date    Cancer     Sinus Tumor    Hypertension      Past Surgical History:   Procedure Laterality Date    CARDIAC SURGERY      Bypass    PORTACATH PLACEMENT Right      Family History   Problem Relation Name Age of Onset    No Known Problems Mother      No Known Problems Father       Social History[1]  Review of Systems    Physical Exam     Initial Vitals [06/20/25 0311]   BP Pulse Resp Temp SpO2   (!) 64/46 110 16 99.4 °F (37.4 °C) 98 %      MAP       --         Physical Exam    ED Course   Procedures  Labs Reviewed   COMPREHENSIVE METABOLIC PANEL - Abnormal       Result Value    Sodium 126 (*)     Potassium 3.4 (*)     Chloride 101      CO2 19 (*)     Glucose 178 (*)     Blood Urea Nitrogen 23 (*)     Creatinine 1.57 (*)     Calcium 7.1 (*)     Protein Total 5.2 (*)     Albumin 2.8 (*)     Globulin 2.4      Albumin/Globulin Ratio 1.2      Bilirubin Total 0.9      ALP 54      ALT 25      AST 26      eGFR 38      Anion Gap 6.0      BUN/Creatinine Ratio 15     LACTIC ACID, PLASMA - Abnormal    Lactic Acid Level 3.0 (*)    URINALYSIS, REFLEX TO URINE CULTURE - Abnormal    Color, UA Yellow      Appearance, UA Clear      Specific Gravity, UA 1.010      pH, UA 6.0      Protein, UA 30 (*)     Glucose, UA Negative      Ketones, UA Negative      Blood, UA Small (*)     Bilirubin, UA Negative      Urobilinogen, UA 0.2      Nitrites, UA Negative      Leukocyte Esterase, UA Negative      Narrative:      URINE STABILITY IS 2 HOURS AT ROOM TEMP OR    SIX HOURS REFRIGERATED. PERFORMING TESTING ON    SPECIMENS GREATER THAN THIS AGE MAY AFFECT THE    FOLLOWING TESTS:    PH          SPECIFIC GRAVITY           BLOOD    CLARITY     BILIRUBIN               UROBILINOGEN   MAGNESIUM - Abnormal     Magnesium Level 0.80 (*)    CBC WITH DIFFERENTIAL - Abnormal    WBC 2.56 (*)     RBC 2.70 (*)     Hgb 7.6 (*)     Hct 22.4 (*)     MCV 83.0      MCH 28.1      MCHC 33.9      RDW 16.2 (*)     Platelet 72 (*)     MPV 10.4      NRBC% 0.0     NT-PRO NATRIURETIC PEPTIDE - Abnormal    ProBNP 8,100.0 (*)    TROPONIN I - Abnormal    Troponin-I 0.059 (*)    MANUAL DIFFERENTIAL - Abnormal    Neutrophils % 56      Bands % 12 (*)     Lymphs % 7 (*)     Monocytes % 13 (*)     Metamyelocytes % 1 (*)     Myelocytes % 1 (*)     Abnormal Lymphs % 10      Neutrophils Abs Calc 1.7408 (*)     Lymphs Abs 0.1792 (*)     Monocytes Abs 0.3328      Platelets Decreased (*)     Anisocytosis Slight (*)     Microcytosis Slight (*)    LACTIC ACID, PLASMA - Abnormal    Lactic Acid Level 2.1 (*)    URINALYSIS, MICROSCOPIC - Abnormal    Bacteria, UA Few (*)     Mucous, UA Trace (*)     RBC, UA 0-5      WBC, UA 0-2      Squamous Epithelial Cells, UA Occasional     CLOSTRIDIOIDES DIFFICILE TOXIN A AND B, EIA - Normal    Clostridioides difficile GDH Antigen Negative      Clostridioides difficile Toxin A/B Negative     COVID/FLU A&B PCR - Normal    Influenza A PCR Not Detected      Influenza B PCR Not Detected      SARS-CoV-2 PCR Negative      Narrative:     The Xpert Xpress SARS-CoV-2/FLU/RSV plus is a rapid, multiplexed real-time PCR test intended for the simultaneous qualitative detection and differentiation of SARS-CoV-2, Influenza A, Influenza B, and respiratory syncytial virus (RSV) viral RNA in either nasopharyngeal swab or nasal swab specimens.         STREP GROUP A BY PCR - Normal    STREP A PCR ([facility]) Not Detected      Narrative:     The Xpert Xpress Strep A test is a rapid, qualitative in vitro diagnostic test for the detection of Streptococcus pyogenes (Group A ß-hemolytic Streptococcus, Strep A) in throat swab specimens from patients with signs and symptoms of pharyngitis.     BLOOD CULTURE [facility]   BLOOD CULTURE [facility]   CBC W/ AUTO DIFFERENTIAL     Narrative:     The following orders were created for panel order CBC auto differential.  Procedure                               Abnormality         Status                     ---------                               -----------         ------                     CBC with Differential[[id number]]       Abnormal            Final result               Manual Differential[[id number]]         Abnormal            Final result                 Please view results for these tests on the individual orders.   GI PANEL    Stool Consistancy liquid      CAMPYLOBACTER Not Detected      PLESIOMONAS SHIGELLOIDES Not Detected      SALMONELLA Not Detected      Vibrio sp. Not Detected      VIBRIO CHOLERAE Not Detected      YERSINIA ENTEROCOLITICA Not Detected      ENTEROAGGREGATIVE E. COLI (EAEC) Not Detected      ENTEROPATHOGENIC E. COLI (EPEC) Not Detected      Enterotoxigenic E. coli (ETEC) Not Detected      SHIGA-LIKE TOXIN-PRODUCING E. COLI (STEC) Not Detected      E. COLI O157        Shigella/Enteroinvasive E. coli (EIEC) Not Detected      CRYPTOSPORIDIUM Not Detected      Cyclospora cayetanensis Not Detected      Entamoeba histolytica Not Detected      Giardia lamblia Not Detected      Adenovirus F 40/41 Not Detected      Astrovirus Not Detected      Norovirus GI/GII Not Detected      Rotavirus A Not Detected      Sapovirus Not Detected      Narrative:     The BioFire GI Panel is a multiplexed nucleic acid test intended for use with the [employer]® Film[employer]®, [employer]® 2.0, or [employer]® [employer] for the simultaneous qualitative detection and identification of nucleic acids from multiple bacteria, viruses, and parasites directly from stool samples in Brenda Sachin transport medium obtained from individuals with signs and/or symptoms of gastrointestinal infection.   TYPE & SCREEN    Group & Rh O POS      Indirect Kenny GEL NEG      Specimen Outdate 06/23/2025 23:59     ABORH RETYPE    ABORH Retype O POS          ECG Results               EKG 12-lead (Preliminary result)  Result time 06/20/25 03:30:00      Wet Read by Ke Weathers MD (06/20/25 03:30:00, [facility], Emergency Medicine)    Sinus tachycardia, heart rate 110, normal intervals, normal axis, normal P waves, normal QRS, nonspecific ST and T-wave changes, normal QT                                  Imaging Results              X-Ray Chest AP Portable (Final result)  Result time 06/20/25 04:14:04      Final result by Shanell Messina III, MD (06/20/25 04:14:04)                   Impression:      1. The heart is mildly to moderately enlarged with vascular congestion and increased interstitial lung markings.  2. The distal tip of the patient's left-sided PICC line is located within the region of the middle 3rd of the superior vena cava.  3. Chronic changes are present as described above.      Electronically signed by: Shanell Messina  Date:    06/20/2025  Time:    04:14               Narrative:    EXAMINATION:  STUDY: XR CHEST AP PORTABLE    CLINICAL HISTORY AND TECHNIQUE:  Suspected sepsis    COMPARISON:  04/26/2025    FINDINGS:  Postoperative changes indicated previous median sternotomy.The heart is mildly to moderately enlarged with vascular congestion and slightly increased interstitial lung markings.I see no lobar or segmental infiltrates.No significant pleural effusions are noted.There is moderate demineralization of the skeletal structures with mild degenerative changes noted involving the thoracic spine.  Atherosclerotic calcifications are noted within the aortic arch.  A left-sided PICC line is present with the distal tip located within the region of the middle 3rd of the superior vena cava.                        Wet Read by Ke Weathers MD (06/20/25 03:48:35, [facility], Emergency Medicine)    Cardiomegaly, prominent pulmonary vascular congestion                                     Medications   vancomycin -  pharmacy to dose (has no administration in time range)   NORepinephrine bitartrate-D5W 4 mg/250 mL (16 mcg/mL) PERIPHERAL access infusion (0.04 mcg/kg/min × 108.7 kg Intravenous New Bag 6/20/25 1126)   sodium chloride 0.9% bolus 1,000 mL 1,000 mL (0 mLs Intravenous Stopped 6/20/25 0402)   piperacillin-tazobactam (ZOSYN) 4.5 g in D5W 100 mL IVPB (MB+) (0 g Intravenous Stopped 6/20/25 0515)   sodium chloride 0.9% bolus 2,331 mL 2,331 mL (0 mLs Intravenous Stopped 6/20/25 0618)   vancomycin 2,000 mg in D5W 500 mL IVPB (admixture device) (0 mg Intravenous Stopped 6/20/25 0834)   magnesium sulfate in dextrose IVPB (premix) 1 g (0 g Intravenous Stopped 6/20/25 0813)   acetaminophen tablet 1,000 mg (1,000 mg Oral Given 6/20/25 0744)   ketorolac injection 15 mg (15 mg Intravenous Given 6/20/25 0946)   sodium chloride 0.9% bolus 500 mL 500 mL (0 mLs Intravenous Stopped 6/20/25 1047)     Medical Decision Making  Differential diagnosis:  Pancytopenia, sepsis, septic shock, anemia, diarrhea, generalized weakness, lymphoma, NSTEMI, hypertension    At 11:20 a.m.  Dr. Wilson of [facility] accepted patient in transfer    Amount and/or Complexity of Data Reviewed  Labs: ordered.  Radiology: ordered and independent interpretation performed.    Risk  OTC drugs.  Prescription drug management.                                      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, unspecified B-cell lymphoma type  [E87.1] Hyponatremia  [N17.9] Acute kidney injury  [E83.42] Hypomagnesemia  [A41.9, R65.20] Severe sepsis (Primary)  [R79.89] Elevated troponin level  [R79.89] Elevated brain natriuretic peptide (BNP) level          ED Disposition Condition    Transfer to Another Facility Stable                      [1]   Social History  Tobacco Use    Smoking status: Every Day     Current packs/day: 1.00      Average packs/day: 1 pack/day for 43.5 years (43.5 ttl pk-yrs)     Types: Cigarettes     Start date: 1982    Smokeless tobacco: Never   Substance Use Topics    Alcohol use: Not Currently    Drug use: Not Currently        Corrie Rivas MD  06/20/25 3210

## 2025-06-23 LAB
BACTERIA BLD CULT: ABNORMAL
BACTERIA BLD CULT: ABNORMAL
GRAM STN SPEC: ABNORMAL